# Patient Record
Sex: MALE | Race: WHITE | NOT HISPANIC OR LATINO | Employment: STUDENT | URBAN - METROPOLITAN AREA
[De-identification: names, ages, dates, MRNs, and addresses within clinical notes are randomized per-mention and may not be internally consistent; named-entity substitution may affect disease eponyms.]

---

## 2022-01-28 ENCOUNTER — HOSPITAL ENCOUNTER (EMERGENCY)
Facility: CLINIC | Age: 22
Discharge: HOME | End: 2022-01-29
Attending: EMERGENCY MEDICINE | Admitting: EMERGENCY MEDICINE
Payer: COMMERCIAL

## 2022-01-28 ENCOUNTER — APPOINTMENT (OUTPATIENT)
Dept: CT IMAGING | Facility: CLINIC | Age: 22
End: 2022-01-28
Attending: EMERGENCY MEDICINE
Payer: COMMERCIAL

## 2022-01-28 DIAGNOSIS — F10.920 ALCOHOLIC INTOXICATION WITHOUT COMPLICATION (H): ICD-10-CM

## 2022-01-28 DIAGNOSIS — S09.90XA INJURY OF HEAD, INITIAL ENCOUNTER: ICD-10-CM

## 2022-01-28 PROCEDURE — 99284 EMERGENCY DEPT VISIT MOD MDM: CPT | Performed by: EMERGENCY MEDICINE

## 2022-01-28 PROCEDURE — 70450 CT HEAD/BRAIN W/O DYE: CPT

## 2022-01-28 PROCEDURE — 70450 CT HEAD/BRAIN W/O DYE: CPT | Mod: 26 | Performed by: RADIOLOGY

## 2022-01-28 PROCEDURE — 99285 EMERGENCY DEPT VISIT HI MDM: CPT | Mod: 25 | Performed by: EMERGENCY MEDICINE

## 2022-01-28 ASSESSMENT — MIFFLIN-ST. JEOR: SCORE: 1661.71

## 2022-01-29 VITALS
HEIGHT: 67 IN | WEIGHT: 155 LBS | RESPIRATION RATE: 16 BRPM | HEART RATE: 61 BPM | DIASTOLIC BLOOD PRESSURE: 64 MMHG | OXYGEN SATURATION: 100 % | BODY MASS INDEX: 24.33 KG/M2 | TEMPERATURE: 97.7 F | SYSTOLIC BLOOD PRESSURE: 103 MMHG

## 2022-01-29 LAB — GLUCOSE BLDC GLUCOMTR-MCNC: 95 MG/DL (ref 70–99)

## 2022-01-29 PROCEDURE — 96374 THER/PROPH/DIAG INJ IV PUSH: CPT | Performed by: EMERGENCY MEDICINE

## 2022-01-29 PROCEDURE — 250N000011 HC RX IP 250 OP 636: Performed by: EMERGENCY MEDICINE

## 2022-01-29 PROCEDURE — 96361 HYDRATE IV INFUSION ADD-ON: CPT | Performed by: EMERGENCY MEDICINE

## 2022-01-29 PROCEDURE — 258N000003 HC RX IP 258 OP 636: Performed by: EMERGENCY MEDICINE

## 2022-01-29 RX ORDER — SODIUM CHLORIDE 9 MG/ML
INJECTION, SOLUTION INTRAVENOUS CONTINUOUS
Status: DISCONTINUED | OUTPATIENT
Start: 2022-01-29 | End: 2022-01-29 | Stop reason: HOSPADM

## 2022-01-29 RX ORDER — ONDANSETRON 2 MG/ML
4 INJECTION INTRAMUSCULAR; INTRAVENOUS EVERY 30 MIN PRN
Status: DISCONTINUED | OUTPATIENT
Start: 2022-01-29 | End: 2022-01-29 | Stop reason: HOSPADM

## 2022-01-29 RX ADMIN — SODIUM CHLORIDE: 900 INJECTION, SOLUTION INTRAVENOUS at 01:19

## 2022-01-29 RX ADMIN — SODIUM CHLORIDE 1000 ML: 900 INJECTION, SOLUTION INTRAVENOUS at 01:18

## 2022-01-29 RX ADMIN — ONDANSETRON 4 MG: 2 INJECTION INTRAMUSCULAR; INTRAVENOUS at 01:17

## 2022-01-29 NOTE — DISCHARGE INSTRUCTIONS
"Please monitor your symptoms.  Your head CT showed no acute intercranial pathology.  If you develop severe headache or any new neurologic deficits please return to the ER.    You may have a concussion which includes things such as headache, brain fog, nausea.  The symptoms typically resolve on their own within 2 weeks but recommend \"cognitive rest\" while you have ongoing symptoms which includes minimal screen time, you may need to avoid things that provoke symptoms such as physical exertion or concentration.  If you develop any signs or symptoms of concussion please follow-up with your primary care doctor.    Please drink responsibly.    "

## 2022-01-29 NOTE — ED NOTES
Pt. Verbalized understanding of discontinue paperwork . Pt does not appear to be in distress at this time . Pt is walking with steady gait . Pt is a/o x4 Pt discharged with friend. Pt friend will assit pt. With getting home . Pt states  that he is not driving and taking rideshate

## 2022-01-29 NOTE — ED NOTES
Pt. Was able to waked to the bathroom with a steady gait . Pt is a/o x 4 ./ Pt has passed swallow screen

## 2022-01-29 NOTE — ED TRIAGE NOTES
Came into triage with friends in a wheelchair from home/frat house    Pt slipped and hit his head pretty hard. This happened 25 minutes ago. He had lost consciousness after the fall on the couch for five minutes after his friends got him up from the fall, then regained consciousness and his friends brought him here. Pt had a lot of shots and drinks, he is pretty drunk. Hit back of his head.   VSS  Denies use of blood thinners.

## 2022-01-29 NOTE — ED NOTES
Pt  Extremely lethargic . Pt is avbled to state name and D.O.B upon waking up . Pt does not appear to be in distress. Pt has been placed on monitor . Pt pupils are 3 mm and reactive to light . Pt head is free of obvious external  trauma and external bleeding. Pt. Friend is at bedside .

## 2022-01-29 NOTE — ED PROVIDER NOTES
ED Provider Note  [facility]      History     Chief Complaint   Patient presents with     Fall     The history is provided by medical records, the patient and a friend. History limited by: patient intoxicated.     Guzman Santiago is an otherwise-healthy 22 year old male who presents to the ED today after a fall for evaluation of a head injury. Patient is brought in by a friend who gives history as the patient is intoxicated. He endorses drinking tonight. His friend reports the patient slipped and fell on a wet, wood floor striking his head very hard. His friends picked him up and put him on the couch where he then passed out for a few minutes. They noted no seizure like activity. He then woke up, but did not remember hitting his head. He has no lacerations or bleeding from the scalp. He denies neck pain, extremity injuries, numbness, tingling, weakness, and anticoagulation.  He otherwise has no past medical history.  No other injuries from this.    Past Medical History  History reviewed. No pertinent past medical history.  History reviewed. No pertinent surgical history.  No current outpatient medications on file.    No Known Allergies  Family History  No family history on file.  Social History   Social History     Tobacco Use     Smoking status: Never Smoker     Smokeless tobacco: Never Used   Substance Use Topics     Alcohol use: Yes     Comment: drinks three times a week like a college kid      Drug use: Never      Past medical history, past surgical history, medications, allergies, family history, and social history were reviewed with the patient. No additional pertinent items.       Review of Systems   Reason unable to perform ROS: altered mental status, intoxication.     A complete review of systems was performed with pertinent positives and negatives noted in the HPI, and all other systems negative.    Physical Exam   BP: 120/65  Pulse: 84  Temp: 97.7  F (36.5  C)  Resp:  "16  Height: 170.2 cm (5' 7\")  Weight: 70.3 kg (155 lb)  SpO2: 100 %  Physical Exam  General: awake, alert, NAD  Head: normal cephalic, atraumatic  HEENT: pupils equal, conjugate gaze intact  Neck: Supple, no midline neck tenderness  CV: regular rate and rhythm without murmur  Lungs: clear to auscultation  Abd: soft, non-tender, no guarding, no peritoneal signs  EXT: Upper and lower extremities without deformity or tenderness.   Neuro: Patient is somnolent but arouses to verbal stimuli.  Patient has slurred speech.  Patient appears clinically intoxicated, occasionally laughing, defers to his friend for most of the answers, falls asleep easily.  No focal deficits noted.  Moves all extremities normally.      ED Course      Procedures       The medical record was reviewed and interpreted.  Current labs reviewed and interpreted.  Current images reviewed and interpreted: No acute intercranial pathology noted..              Results for orders placed or performed during the hospital encounter of 01/28/22   Head CT w/o contrast     Status: None    Narrative    EXAM: CT HEAD W/O CONTRAST  LOCATION: [facility]  DATE/TIME: 1/28/2022 10:52 PM    INDICATION: Head injury. Altered mental status. Confusion.  COMPARISON: None.  TECHNIQUE: Routine CT Head without IV contrast. Multiplanar reformats. Dose reduction techniques were used.    FINDINGS:  INTRACRANIAL CONTENTS: No intracranial hemorrhage, extraaxial collection, or mass effect.  No CT evidence of acute infarct. Normal parenchymal attenuation. Normal ventricles and sulci.     VISUALIZED ORBITS/SINUSES/MASTOIDS: No intraorbital abnormality. No paranasal sinus mucosal disease. No middle ear or mastoid effusion.    BONES/SOFT TISSUES: No acute abnormality.      Impression    IMPRESSION:  1.  No acute intracranial process.   Glucose by meter     Status: Normal   Result Value Ref Range    GLUCOSE BY METER POCT 95 70 - 99 mg/dL "     Medications - No data to display     Assessments & Plan (with Medical Decision Making)   22-year-old male who presents with with altered mental status after head injury.  Patient appears clinically intoxicated, endorses drinking today and is a poor historian.  Friend notes that he hit his head hard after slipping on water and had an episode of loss of consciousness.  Given altered mental status on exam will obtain head CT and a point-of-care glucose.  If these are negative plan is to follow with serial neurologic exams.  If patient fails to clear clinically would expand evaluation to include laboratory studies and additional work-up.    Differential includes alcohol intoxication, hypoglycemia, intracranial pathology such as bleed, concussion.    Patient is a normal point-of-care glucose.  Patient has normal head CT.  On reassessment patient is more awake, alert though not quite steady on his feet.  Will continue to observe and once patient is safely ambulating and taking p.o. will anticipate he will be safe to discharge. Will sign out to Dr. Elizabeth who will reassess and discharge once clinically sober.       This part of the medical record was transcribed by Chantale Garrett, Medical Scribe, from a dictation done by Man Staples MD.     I have reviewed the nursing notes. I have reviewed the findings, diagnosis, plan and need for follow up with the patient.    New Prescriptions    No medications on file       Final diagnoses:   Injury of head, initial encounter   Alcoholic intoxication without complication (H)   I, Chantale Garrett, am serving as a trained medical scribe to document services personally performed by Man Staples MD, based on the provider's statements to me.     I, Man Staples MD, was physically present and have reviewed and verified the accuracy of this note documented by Chantale Garrett.      --  Man Staples MD  [facility] EMERGENCY DEPARTMENT  1/28/2022     Man Staples  MD Mele  01/29/22 0108

## 2022-01-29 NOTE — ED PROVIDER NOTES
"Patient received in sign-out from prior attending     Guzman Santiago is a 22 year old who presenting to the ED for altered mental status, head trauma in the setting of acute alcohol intoxication and ultimately diagnosed with acute alcohol intoxication.  There was history of head injury patient had a head CT which is unrevealing    Plan is continue monitoring and discharge when improved and safe for home    Dispo on reevaluation at 6:30 AM, the patient has a steady gait.  He is able to tolerate p.o.  He is accompanied now by a friend who is also clinically sober.  They plan to go home by either.  While in the emergency department is had no further vomiting after his head CT.  Appropriate for discharge with instructions as given.      /83   Pulse 75   Temp 97.7  F (36.5  C) (Oral)   Resp 16   Ht 1.702 m (5' 7\")   Wt 70.3 kg (155 lb)   SpO2 95%   BMI 24.28 kg/m             Jacob Elizabeth MD  01/29/22 0632    "

## 2022-04-03 ENCOUNTER — HEALTH MAINTENANCE LETTER (OUTPATIENT)
Age: 22
End: 2022-04-03

## 2022-10-03 ENCOUNTER — HEALTH MAINTENANCE LETTER (OUTPATIENT)
Age: 22
End: 2022-10-03

## 2023-05-20 ENCOUNTER — HEALTH MAINTENANCE LETTER (OUTPATIENT)
Age: 23
End: 2023-05-20

## 2024-07-28 ENCOUNTER — HEALTH MAINTENANCE LETTER (OUTPATIENT)
Age: 24
End: 2024-07-28